# Patient Record
Sex: FEMALE | Race: WHITE | NOT HISPANIC OR LATINO | URBAN - METROPOLITAN AREA
[De-identification: names, ages, dates, MRNs, and addresses within clinical notes are randomized per-mention and may not be internally consistent; named-entity substitution may affect disease eponyms.]

---

## 2018-10-23 ENCOUNTER — EMERGENCY (EMERGENCY)
Facility: HOSPITAL | Age: 53
LOS: 1 days | Discharge: HOME | End: 2018-10-23
Attending: EMERGENCY MEDICINE | Admitting: EMERGENCY MEDICINE
Payer: MEDICAID

## 2018-10-23 VITALS
RESPIRATION RATE: 18 BRPM | OXYGEN SATURATION: 96 % | SYSTOLIC BLOOD PRESSURE: 100 MMHG | DIASTOLIC BLOOD PRESSURE: 65 MMHG | TEMPERATURE: 99 F | HEART RATE: 86 BPM

## 2018-10-23 VITALS
DIASTOLIC BLOOD PRESSURE: 70 MMHG | RESPIRATION RATE: 18 BRPM | WEIGHT: 134.48 LBS | OXYGEN SATURATION: 97 % | SYSTOLIC BLOOD PRESSURE: 100 MMHG | HEART RATE: 91 BPM | TEMPERATURE: 99 F

## 2018-10-23 DIAGNOSIS — R50.9 FEVER, UNSPECIFIED: ICD-10-CM

## 2018-10-23 DIAGNOSIS — M79.10 MYALGIA, UNSPECIFIED SITE: ICD-10-CM

## 2018-10-23 DIAGNOSIS — E03.9 HYPOTHYROIDISM, UNSPECIFIED: ICD-10-CM

## 2018-10-23 DIAGNOSIS — N76.0 ACUTE VAGINITIS: ICD-10-CM

## 2018-10-23 LAB
ANION GAP SERPL CALC-SCNC: 15 MMOL/L — SIGNIFICANT CHANGE UP (ref 5–17)
APPEARANCE UR: CLEAR — SIGNIFICANT CHANGE UP
BASOPHILS NFR BLD AUTO: 0.9 % — SIGNIFICANT CHANGE UP (ref 0–2)
BILIRUB UR-MCNC: ABNORMAL
BUN SERPL-MCNC: 11 MG/DL — SIGNIFICANT CHANGE UP (ref 7–23)
CALCIUM SERPL-MCNC: 9.7 MG/DL — SIGNIFICANT CHANGE UP (ref 8.4–10.5)
CHLORIDE SERPL-SCNC: 96 MMOL/L — SIGNIFICANT CHANGE UP (ref 96–108)
CO2 SERPL-SCNC: 23 MMOL/L — SIGNIFICANT CHANGE UP (ref 22–31)
COLOR SPEC: YELLOW — SIGNIFICANT CHANGE UP
CREAT SERPL-MCNC: 0.64 MG/DL — SIGNIFICANT CHANGE UP (ref 0.5–1.3)
DIFF PNL FLD: ABNORMAL
EOSINOPHIL NFR BLD AUTO: 0.3 % — SIGNIFICANT CHANGE UP (ref 0–6)
GLUCOSE SERPL-MCNC: 127 MG/DL — HIGH (ref 70–99)
GLUCOSE UR QL: NEGATIVE — SIGNIFICANT CHANGE UP
HCT VFR BLD CALC: 39.9 % — SIGNIFICANT CHANGE UP (ref 34.5–45)
HGB BLD-MCNC: 13.9 G/DL — SIGNIFICANT CHANGE UP (ref 11.5–15.5)
KETONES UR-MCNC: 40 MG/DL
LEUKOCYTE ESTERASE UR-ACNC: NEGATIVE — SIGNIFICANT CHANGE UP
LYMPHOCYTES # BLD AUTO: 26.4 % — SIGNIFICANT CHANGE UP (ref 13–44)
MCHC RBC-ENTMCNC: 30.2 PG — SIGNIFICANT CHANGE UP (ref 27–34)
MCHC RBC-ENTMCNC: 34.8 G/DL — SIGNIFICANT CHANGE UP (ref 32–36)
MCV RBC AUTO: 86.6 FL — SIGNIFICANT CHANGE UP (ref 80–100)
MONOCYTES NFR BLD AUTO: 7 % — SIGNIFICANT CHANGE UP (ref 2–14)
NEUTROPHILS NFR BLD AUTO: 65.4 % — SIGNIFICANT CHANGE UP (ref 43–77)
NITRITE UR-MCNC: NEGATIVE — SIGNIFICANT CHANGE UP
PH UR: 6 — SIGNIFICANT CHANGE UP (ref 5–8)
PLATELET # BLD AUTO: 154 K/UL — SIGNIFICANT CHANGE UP (ref 150–400)
POTASSIUM SERPL-MCNC: 4.2 MMOL/L — SIGNIFICANT CHANGE UP (ref 3.5–5.3)
POTASSIUM SERPL-SCNC: 4.2 MMOL/L — SIGNIFICANT CHANGE UP (ref 3.5–5.3)
PROT UR-MCNC: ABNORMAL MG/DL
RAPID RVP RESULT: SIGNIFICANT CHANGE UP
RBC # BLD: 4.61 M/UL — SIGNIFICANT CHANGE UP (ref 3.8–5.2)
RBC # FLD: 12.1 % — SIGNIFICANT CHANGE UP (ref 10.3–16.9)
SODIUM SERPL-SCNC: 134 MMOL/L — LOW (ref 135–145)
SP GR SPEC: >=1.03 — SIGNIFICANT CHANGE UP (ref 1–1.03)
UROBILINOGEN FLD QL: 0.2 E.U./DL — SIGNIFICANT CHANGE UP
WBC # BLD: 3.4 K/UL — LOW (ref 3.8–10.5)
WBC # FLD AUTO: 3.4 K/UL — LOW (ref 3.8–10.5)

## 2018-10-23 PROCEDURE — 85025 COMPLETE CBC W/AUTO DIFF WBC: CPT

## 2018-10-23 PROCEDURE — 87581 M.PNEUMON DNA AMP PROBE: CPT

## 2018-10-23 PROCEDURE — 81001 URINALYSIS AUTO W/SCOPE: CPT

## 2018-10-23 PROCEDURE — 36415 COLL VENOUS BLD VENIPUNCTURE: CPT

## 2018-10-23 PROCEDURE — 87798 DETECT AGENT NOS DNA AMP: CPT

## 2018-10-23 PROCEDURE — 87486 CHLMYD PNEUM DNA AMP PROBE: CPT

## 2018-10-23 PROCEDURE — 87633 RESP VIRUS 12-25 TARGETS: CPT

## 2018-10-23 PROCEDURE — 99284 EMERGENCY DEPT VISIT MOD MDM: CPT

## 2018-10-23 PROCEDURE — 96374 THER/PROPH/DIAG INJ IV PUSH: CPT

## 2018-10-23 PROCEDURE — 99284 EMERGENCY DEPT VISIT MOD MDM: CPT | Mod: 25

## 2018-10-23 PROCEDURE — 87086 URINE CULTURE/COLONY COUNT: CPT

## 2018-10-23 PROCEDURE — 80048 BASIC METABOLIC PNL TOTAL CA: CPT

## 2018-10-23 RX ORDER — METRONIDAZOLE 500 MG
1 TABLET ORAL
Qty: 14 | Refills: 0 | OUTPATIENT
Start: 2018-10-23 | End: 2018-10-29

## 2018-10-23 RX ORDER — IBUPROFEN 200 MG
1 TABLET ORAL
Qty: 30 | Refills: 0 | OUTPATIENT
Start: 2018-10-23

## 2018-10-23 RX ORDER — SODIUM CHLORIDE 9 MG/ML
1000 INJECTION INTRAMUSCULAR; INTRAVENOUS; SUBCUTANEOUS ONCE
Qty: 0 | Refills: 0 | Status: COMPLETED | OUTPATIENT
Start: 2018-10-23 | End: 2018-10-23

## 2018-10-23 RX ORDER — KETOROLAC TROMETHAMINE 30 MG/ML
15 SYRINGE (ML) INJECTION ONCE
Qty: 0 | Refills: 0 | Status: DISCONTINUED | OUTPATIENT
Start: 2018-10-23 | End: 2018-10-23

## 2018-10-23 RX ADMIN — Medication 15 MILLIGRAM(S): at 22:09

## 2018-10-23 RX ADMIN — SODIUM CHLORIDE 1000 MILLILITER(S): 9 INJECTION INTRAMUSCULAR; INTRAVENOUS; SUBCUTANEOUS at 22:10

## 2018-10-23 NOTE — ED ADULT NURSE NOTE - NS ED PATIENT SAFETY CONCERN
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

## 2018-10-23 NOTE — ED ADULT NURSE NOTE - NSIMPLEMENTINTERV_GEN_ALL_ED
Implemented All Universal Safety Interventions:  [place] to call system. Call bell, personal items and telephone within reach. Instruct patient to call for assistance. Room bathroom lighting operational. Non-slip footwear when patient is off stretcher. Physically safe environment: no spills, clutter or unnecessary equipment. Stretcher in lowest position, wheels locked, appropriate side rails in place.

## 2018-10-23 NOTE — ED ADULT TRIAGE NOTE - CHIEF COMPLAINT QUOTE
pt c/o vomiting, severe, generalized body aches " feeling dehydrated". decreased appetite, chills and feeling warm sometimes. burning with urination.

## 2018-10-23 NOTE — ED PROVIDER NOTE - MEDICAL DECISION MAKING DETAILS
Patient in ED with concern for body aches and not feeling well in general over the past 2 weeks.  Patient with benign physical exam.  Feeling improved in ED with toradol, IVF.  She later notes on re evaluation that she is concerned for BV.  She notes vaginal irritation and smell consistent with what she has experienced with BV in the past.  Pelvic exam is offered, however patient declined stating current symptoms are typical of what she has experienced in the past with BV.  Labs reviewed.  Small amount of bacteria noted in UA.  Urine culture sent.  Given patient's symptoms, will treat with flagyl and advise prompt PCP follow up in 1-2 days to ensure symptoms are resolving.  Patient is encouraged to return immediately to ED should symptoms worsen or if she has any concern prior to this recommended follow up.  Patient aware of plan and verbalizes her understanding.  Will discharge home at this time.

## 2018-10-23 NOTE — ED ADULT NURSE NOTE - OBJECTIVE STATEMENT
Pt c/o not feeling well for the past couple of weeks. Pt states she's vomited for the past couple of days, and has not had any appetite. Pt also states she felt like she had a fever at home. Pt has hx of hypothyroidism. Denies diarrhea, abdominal pain, chest pain, dizziness.

## 2018-10-23 NOTE — ED PROVIDER NOTE - OBJECTIVE STATEMENT
53 year old female with history of hypothyroidism and depression presents to ED with concern for not feeling herself over the past few weeks.  Patient states she has not had any appetite and "everything just looks disgusting."  She notes subjective fevers and chills, and states she has had intermittent nausea with eating.  She denies associated abdominal pain, cough, sore throat, ear pain, shortness of breath, chest pain, vomiting or any additional acute complaints or concerns at this time.  She denies any new medications or recent changes to medication dosages.

## 2018-10-25 LAB
CULTURE RESULTS: NO GROWTH — SIGNIFICANT CHANGE UP
SPECIMEN SOURCE: SIGNIFICANT CHANGE UP

## 2025-01-31 NOTE — ED ADULT NURSE NOTE - NS ED NOTE  TALK SOMEONE YN
For any questions or concerns regarding your surgery within the first 72 hours please always call your surgeons office.     Discharge Instructions    Diet as tolerated  Resume usual medications  Take new medications as prescribed.  Activity as tolerated  No heavy lifting  Change peg pad as needed  No use of tampons or douche for 2 weeks  May resume sexual activity after 2 weeks  May drive when not on pain medications or not in pain    Patient/Family given For your Well Being Teaching Sheet:  _x__ Care After Anesthesia/ Sedation  _x__ Pain Management Tips  _x__ About Surgical Site infection  ___ Smoking and second hand Smoke information  ___ Other:     FOLLOW-UP CARE  ___ Make an appointment to see your doctor in ___14____days.  ___ Call for results in ______days    Call your doctor if you have:  Bleeding that soaks more than one sanitary pad in one hour  Severe abdominal pain  Severe cramps  Fever above 100.4 (F)  Foul smelling vaginal discharge  Trouble urinating          Want to Say “Thank You” to a Nurse?  The [facility] Award® was created in memory of RADHA Ivan by his family to say thank you to bedside nurses who provide an outstanding level of care.  Submit a nomination using any method below.     OR    [email or web address]      
No